# Patient Record
Sex: MALE | Race: WHITE | URBAN - METROPOLITAN AREA
[De-identification: names, ages, dates, MRNs, and addresses within clinical notes are randomized per-mention and may not be internally consistent; named-entity substitution may affect disease eponyms.]

---

## 2017-08-17 ENCOUNTER — OFFICE VISIT (OUTPATIENT)
Dept: FAMILY MEDICINE CLINIC | Facility: CLINIC | Age: 37
End: 2017-08-17

## 2017-08-17 VITALS
SYSTOLIC BLOOD PRESSURE: 120 MMHG | RESPIRATION RATE: 14 BRPM | TEMPERATURE: 98.1 F | DIASTOLIC BLOOD PRESSURE: 80 MMHG | WEIGHT: 196.4 LBS | HEART RATE: 68 BPM | OXYGEN SATURATION: 97 % | HEIGHT: 71 IN | BODY MASS INDEX: 27.5 KG/M2

## 2017-08-17 DIAGNOSIS — Z13.220 SCREENING, LIPID: ICD-10-CM

## 2017-08-17 DIAGNOSIS — F90.9 ATTENTION DEFICIT HYPERACTIVITY DISORDER (ADHD), UNSPECIFIED ADHD TYPE: ICD-10-CM

## 2017-08-17 DIAGNOSIS — F81.9 LEARNING DIFFICULTY: ICD-10-CM

## 2017-08-17 DIAGNOSIS — Z00.00 ROUTINE GENERAL MEDICAL EXAMINATION AT A HEALTH CARE FACILITY: Primary | ICD-10-CM

## 2017-08-17 RX ORDER — DEXTROAMPHETAMINE SACCHARATE, AMPHETAMINE ASPARTATE MONOHYDRATE, DEXTROAMPHETAMINE SULFATE AND AMPHETAMINE SULFATE 5; 5; 5; 5 MG/1; MG/1; MG/1; MG/1
20 CAPSULE, EXTENDED RELEASE ORAL
Qty: 30 CAP | Refills: 0 | Status: SHIPPED | OUTPATIENT
Start: 2017-08-17 | End: 2017-09-19 | Stop reason: DRUGHIGH

## 2017-08-17 NOTE — PROGRESS NOTES
SUBJECTIVE:  Vern Seymour is a 40y.o. year old male   Chief Complaint   Patient presents with    Behavioral Problem    Establish Care       History of Present Illness:   He has history of ADHD since childhood. He has had psychologic evaluations. Last one was done prior to this OV. He was advised to get ADHD Rx for upcoming college program.  He has been treated with Adderall off and on since elementary school to college. He has done well with it. He has H/O LD also. Patient denies any other issues. He has otherwise, normal Sensory, Motor, Social and  Language function. His symptoms are mostly moderate inattention. The patient often:  a. fails to give close attention to details or makes careless mistakes at work and during other activities. b. has difficulty sustaining attention in tasks or play activities  c. does not seem to listen when spoken to directly. d. does not follow through on instructions and fails to finish duties in the workplace. e. has difficulty organizing tasks and activities   f. avoids, dislikes, or is reluctant to engage in tasks that require sustained mental effort   g. Is easily distracted by extraneous stimuli   h. Is forgetful in daily activities. He denies any significant hyperactivity and impulsivity     Several inattentive symptoms were present prior to age 15 years. Several inattentive symptoms are present in two or more settings (at home, school, or work). There is clear evidence that the symptoms interfere with, or reduce the quality of, social, academic, or occupational functioning. No history of schizophrenia or another psychotic disorder. No history of depression, Bipolar disorder, Anxiety disorder or substance abuse.     Past Medical History:   Diagnosis Date    ADHD (attention deficit hyperactivity disorder) 80    Hx of gallstones 05/01/2011    Pseudotumor cerebri syndrome 01/01/1994     Past Surgical History:   Procedure Laterality Date    HX CHOLECYSTECTOMY  05/01/2011        No current outpatient prescriptions on file. No current facility-administered medications for this visit. Allergies   Allergen Reactions    Ddavp [Desmopressin] Other (comments)     Pseudotumor cerebri        Family History   Problem Relation Age of Onset    No Known Problems Mother     Arthritis-osteo Father         Social History   Substance Use Topics    Smoking status: Never Smoker    Smokeless tobacco: Never Used    Alcohol use Yes      Comment: occa. Review of Systems:   Constitutional: No fever, chills, night sweats, malaise, dizziness. Eyes: No eye discomfort, discharge, redness, new visual changes. Ear/Nose/Throat: No ear/ throat/ sinus pain, lesions, unusual discharge, new speaking or hearing problems, epistaxis. Cardiovascular: No angina, palpitations, PND, orthopnea, lightheadedness, edema, claudication. Respiratory: No dyspnea, wheeze, pleurisy, hemoptysis, unusual cough or sputum. Gastrointestinal: No nausea/ vomiting, bowel habit change, pain, BISI symptoms, melena, hematochezia, anorexia. Genitourinary: Denies any comlaints  Musculoskeletal: No joint swelling/pain, instability, focal weakness, stiffness/rigidity, radicular pain. Skin/Breast: Denies any complaints. Neurological: No seizures, numbness, dizziness, speech abnormality, incontinence. Psychiatric: No agitation, confusion/disorientation, suicidal or homicidal ideation. Endocrine: Denies any complaints. Heme/Onc/Lympha: Denies any complaints. Allergy/Immunol: Denies any complaints. OBJECTIVE:  Physical Exam:   Constitutional: General Appearance:  well developed, well nourished, nontoxic, in no acute distress.    Visit Vitals    /80 (BP 1 Location: Left arm, BP Patient Position: Sitting)    Pulse 68    Temp 98.1 °F (36.7 °C) (Oral)    Resp 14    Ht 5' 10.75\" (1.797 m)    Wt 196 lb 6.4 oz (89.1 kg)    SpO2 97%    BMI 27.59 kg/m2     Eyes: Conjunctiva: normal & Lids: normal.  Pupils & Irises: normal size; equal, round and reactive to light. ENT/Mouth: Otoscopic Examination: ear canals are clear; tympanic membranes are clear. Nose: clear. Throat:  Clear. Neck Area: Neck: without masses, symmetric, trachea is midline. Thyroid:  without significant enlargement, masses or tenderness. Pulmonary: Respiratory effort: normal; no dyspnea, no retractions, no accessory muscle use. Auscultation: normal & symmetrical air exchange; no rales, no rhonchi, no wheeze; no rubs    Cardiovascular: Palpation: PMI not displaced or enlarged, no thrills or heaves. Auscultation: RRR; no murmur, rubs or gallops. Extremities: no edema, no active varicosity. Neck: carotid arteries- normal volume & without bruit; no JVD. Femoral arteries: normal volume, no bruit. Pedal pulses: normal volume. Gastrointestinal: Normal bowel sounds. No masses; no tenderness; no rebound/rigidity; no CVA tenderness. No hepatosplenomegaly. No inguinal, ventral or umbilical hernias. Genitourinary: Scrotal: no cord tenderness, testicular masses, hydrocele or spermatocele. Penis: normal male, no masses, normal urethra, no discharge. Skin: Inspection: no significant rashes, lesions or ulcers. Nodes: Cervical: no significant adenopathy. Inguinal: no significant adenopathy. Psychiatric: Oriented to time, place and person. Normal mood, no agitation or anxiety. Normal affect. Pleasant and cooperative. Neurologic: CN I to XII: intact. DTR: symmetrical & normal.    Musculoskeletal: NC/AT. Neck-supple. Gait and Station: gait- normal; station- normal.  All Extremities: no heat, effusion, redness or tenderness; normal strength/tone; no instability; FROM. Spine/ back/ posture: normal.     ASSESSMENT:     1. Routine general medical examination at a health care facility    2. Attention deficit hyperactivity disorder (ADHD), unspecified ADHD type    3.  Learning difficulty    4. Screening, lipid        PLAN:     Orders Placed This Encounter    CBC WITH AUTOMATED DIFF    METABOLIC PANEL, COMPREHENSIVE    LIPID PANEL    TSH 3RD GENERATION    URINALYSIS W/ RFLX MICROSCOPIC    amphetamine-dextroamphetamine XR (ADDERALL XR) 20 mg XR capsule      Pharmacologic Management: Medications reviewed with the patient. Adderall XR 20 mg daily. Discussed nutrition, education, anticipatory guidence, accident prevention  Discussed DDx, follow-up & work-up. Discussed risk/benefit & side effect of treatment. Follow up visit as planned, prn sooner. Health risk from non adherence discussed. Patient voiced understanding. Follow-up Disposition:  Return in about 4 weeks (around 9/14/2017).     Magnus Carty MD

## 2017-08-17 NOTE — PROGRESS NOTES
Isrrael Negron is a 40 y.o.  male presents today for office visit for ADHD and to establish care. Pt is in Room # 4.      1. Have you been to the ER, urgent care clinic t? Hospitalized ? Yes 05/11/2011 - 05/13/2011 [address] [place] [address] 30, 323 [facility] Gallstones    2. Have you seen or consulted any other health care providers outside of the [address] [place] [address] ? Include any pap smears or colon screening. Yes [facility]. 6/2017      Health Maintenance reviewed.     Upcoming Appts  N/A

## 2017-08-17 NOTE — MR AVS SNAPSHOT
Visit Information Date & Time Provider Department Dept. Phone Encounter #  
 8/17/2017  4:30 PM Latoya Dey MD [facility] [phone] [id number] Follow-up Instructions Return in about 4 weeks (around 9/14/2017). Your Appointments 9/15/2017  3:00 PM  
Follow Up with Latoya Dey MD  
[facility] ([address]) Appt Note: f/u from 8/17/17  
 [address] [place] [address] [phone]  
[address] [facility] [address] Upcoming Health Maintenance Date Due DTaP/Tdap/Td series (1 - Tdap) 4/17/2001 INFLUENZA AGE 9 TO ADULT 8/1/2017 Allergies as of 8/17/2017  Review Complete On: 8/17/2017 By: Latoya Dey MD  
  
 Severity Noted Reaction Type Reactions Ddavp [Desmopressin] High 08/17/2017   Intolerance Other (comments) Pseudotumor cerebri Current Immunizations  Never Reviewed No immunizations on file. Not reviewed this visit You Were Diagnosed With   
  
 Codes Comments Attention deficit hyperactivity disorder (ADHD), unspecified ADHD type    -  Primary ICD-10-CM: F90.9 ICD-9-CM: 314.01 Learning difficulty     ICD-10-CM: F81.9 ICD-9-CM: 315.9 Screening, lipid     ICD-10-CM: L15.901 ICD-9-CM: V77.91 Vitals BP Pulse Temp Resp Height(growth percentile) Weight(growth percentile) 120/80 (BP 1 Location: Left arm, BP Patient Position: Sitting) 68 98.1 °F (36.7 °C) (Oral) 14 5' 10.75\" (1.797 m) 196 lb 6.4 oz (89.1 kg) SpO2 BMI Smoking Status 97% 27.59 kg/m2 Never Smoker Vitals History BMI and BSA Data Body Mass Index Body Surface Area  
 27.59 kg/m 2 2.11 m 2 Preferred Pharmacy Pharmacy Name Phone [facility] [phone] Your Updated Medication List  
  
   
 This list is accurate as of: 8/17/17  5:06 PM.  Always use your most recent med list.  
  
  
  
  
 amphetamine-dextroamphetamine XR 20 mg XR capsule Commonly known as:  ADDERALL XR Take 1 Cap (20 mg total) by mouth every morning. Max Daily Amount: 20 mg  
  
  
  
  
Prescriptions Printed Refills  
 amphetamine-dextroamphetamine XR (ADDERALL XR) 20 mg XR capsule 0 Sig: Take 1 Cap (20 mg total) by mouth every morning. Max Daily Amount: 20 mg  
 Class: Print Route: Oral  
  
Follow-up Instructions Return in about 4 weeks (around 9/14/2017). To-Do List   
 08/19/2017 Lab:  CBC WITH AUTOMATED DIFF   
  
 08/19/2017 Lab:  LIPID PANEL   
  
 08/19/2017 Lab:  METABOLIC PANEL, COMPREHENSIVE   
  
 08/19/2017 Lab:  TSH 3RD GENERATION   
  
 08/19/2017 Lab:  URINALYSIS W/ RFLX MICROSCOPIC Introducing [facility]! Елена Ron introduces [employer] patient portal. Now you can access parts of your medical record, email your doctor's office, and request medication refills online. 1. In your internet browser, go to [email or web address] 2. Click on the First Time User? Click Here link in the Sign In box. You will see the New Member Sign Up page. 3. Enter your [employer] Access Code exactly as it appears below. You will not need to use this code after youve completed the sign-up process. If you do not sign up before the expiration date, you must request a new code. · [employer] Access Code: [email or web address] Expires: 11/15/2017  3:45 PM 
 
4. Enter the last four digits of your Social Security Number (xxxx) and Date of Birth (mm/dd/yyyy) as indicated and click Submit. You will be taken to the next sign-up page. 5. Create a [employer] ID. This will be your [employer]ised login ID and cannot be changed, so think of one that is secure and easy to remember. 6. Create a [employer] password. You can change your password at any time. 7. Enter your Password Reset Question and Answer. This can be used at a later time if you forget your password. 8. Enter your e-mail address. You will receive e-mail notification when new information is available in [address] 9. Click Sign Up. You can now view and download portions of your medical record. 10. Click the Download Summary menu link to download a portable copy of your medical information. If you have questions, please visit the Frequently Asked Questions section of the [employer] website. Remember, [employer] is NOT to be used for urgent needs. For medical emergencies, dial 911. Now available from your iPhone and Android! Please provide this summary of care documentation to your next provider. Your primary care clinician is listed as [address] [place] [address]. If you have any questions after today's visit, please call [phone].

## 2017-08-19 DIAGNOSIS — F90.9 ATTENTION DEFICIT HYPERACTIVITY DISORDER (ADHD), UNSPECIFIED ADHD TYPE: ICD-10-CM

## 2017-08-19 DIAGNOSIS — Z13.220 SCREENING, LIPID: ICD-10-CM

## 2017-08-19 LAB
ALBUMIN SERPL-MCNC: 4.7 G/DL (ref 3.5–5.5)
ALBUMIN/GLOB SERPL: 2.1 {RATIO} (ref 1.2–2.2)
ALP SERPL-CCNC: 46 IU/L (ref 39–117)
ALT SERPL-CCNC: 22 IU/L (ref 0–44)
APPEARANCE UR: CLEAR
AST SERPL-CCNC: 21 IU/L (ref 0–40)
BASOPHILS # BLD AUTO: 0.1 X10E3/UL (ref 0–0.2)
BASOPHILS NFR BLD AUTO: 1 %
BILIRUB SERPL-MCNC: 0.6 MG/DL (ref 0–1.2)
BILIRUB UR QL STRIP: NEGATIVE
BUN SERPL-MCNC: 11 MG/DL (ref 6–20)
BUN/CREAT SERPL: 13 (ref 9–20)
CALCIUM SERPL-MCNC: 9.5 MG/DL (ref 8.7–10.2)
CHLORIDE SERPL-SCNC: 103 MMOL/L (ref 96–106)
CHOLEST SERPL-MCNC: 200 MG/DL (ref 100–199)
CO2 SERPL-SCNC: 29 MMOL/L (ref 18–29)
COLOR UR: YELLOW
CREAT SERPL-MCNC: 0.87 MG/DL (ref 0.76–1.27)
EOSINOPHIL # BLD AUTO: 0.1 X10E3/UL (ref 0–0.4)
EOSINOPHIL NFR BLD AUTO: 1 %
ERYTHROCYTE [DISTWIDTH] IN BLOOD BY AUTOMATED COUNT: 14.6 % (ref 12.3–15.4)
GLOBULIN SER CALC-MCNC: 2.2 G/DL (ref 1.5–4.5)
GLUCOSE SERPL-MCNC: 94 MG/DL (ref 65–99)
GLUCOSE UR QL: NEGATIVE
HCT VFR BLD AUTO: 41.3 % (ref 37.5–51)
HDLC SERPL-MCNC: 56 MG/DL
HGB BLD-MCNC: 13.8 G/DL (ref 12.6–17.7)
HGB UR QL STRIP: NEGATIVE
IMM GRANULOCYTES # BLD: 0 X10E3/UL (ref 0–0.1)
IMM GRANULOCYTES NFR BLD: 1 %
INTERPRETATION, 910389: NORMAL
KETONES UR QL STRIP: NEGATIVE
LDLC SERPL CALC-MCNC: 105 MG/DL (ref 0–99)
LEUKOCYTE ESTERASE UR QL STRIP: NEGATIVE
LYMPHOCYTES # BLD AUTO: 1.6 X10E3/UL (ref 0.7–3.1)
LYMPHOCYTES NFR BLD AUTO: 25 %
MCH RBC QN AUTO: 28.7 PG (ref 26.6–33)
MCHC RBC AUTO-ENTMCNC: 33.4 G/DL (ref 31.5–35.7)
MCV RBC AUTO: 86 FL (ref 79–97)
MICRO URNS: NORMAL
MONOCYTES # BLD AUTO: 0.7 X10E3/UL (ref 0.1–0.9)
MONOCYTES NFR BLD AUTO: 11 %
NEUTROPHILS # BLD AUTO: 3.7 X10E3/UL (ref 1.4–7)
NEUTROPHILS NFR BLD AUTO: 61 %
NITRITE UR QL STRIP: NEGATIVE
PH UR STRIP: 6 [PH] (ref 5–7.5)
PLATELET # BLD AUTO: 330 X10E3/UL (ref 150–379)
POTASSIUM SERPL-SCNC: 5 MMOL/L (ref 3.5–5.2)
PROT SERPL-MCNC: 6.9 G/DL (ref 6–8.5)
PROT UR QL STRIP: NEGATIVE
RBC # BLD AUTO: 4.81 X10E6/UL (ref 4.14–5.8)
SODIUM SERPL-SCNC: 143 MMOL/L (ref 134–144)
SP GR UR: 1.01 (ref 1–1.03)
TRIGL SERPL-MCNC: 193 MG/DL (ref 0–149)
TSH SERPL DL<=0.005 MIU/L-ACNC: 0.95 UIU/ML (ref 0.45–4.5)
UROBILINOGEN UR STRIP-MCNC: 0.2 MG/DL (ref 0.2–1)
VLDLC SERPL CALC-MCNC: 39 MG/DL (ref 5–40)
WBC # BLD AUTO: 6.1 X10E3/UL (ref 3.4–10.8)

## 2017-08-22 NOTE — PROGRESS NOTES
Lab tests were good except for high triglyceride and borderline high LDL. The patient is advised to continue with diet and exercise.

## 2017-08-24 ENCOUNTER — TELEPHONE (OUTPATIENT)
Dept: FAMILY MEDICINE CLINIC | Facility: CLINIC | Age: 37
End: 2017-08-24

## 2017-08-24 NOTE — TELEPHONE ENCOUNTER
Lab tests were good except for high triglyceride and borderline high LDL.  The patient is advised to continue with diet and exercise. Spoke with pt in regards to results Pt acknowledges understanding and voices no concerns at this time.

## 2017-08-24 NOTE — TELEPHONE ENCOUNTER
Received a call from the pt's mother, Melani Quevedo in regards to lab results. Two pt identifier's and permission to release verified. Relayed 's notes. Pt's mother acknowledges understanding and voices no concerns at this time.

## 2017-09-19 ENCOUNTER — OFFICE VISIT (OUTPATIENT)
Dept: FAMILY MEDICINE CLINIC | Facility: CLINIC | Age: 37
End: 2017-09-19

## 2017-09-19 VITALS
DIASTOLIC BLOOD PRESSURE: 76 MMHG | TEMPERATURE: 97.6 F | SYSTOLIC BLOOD PRESSURE: 114 MMHG | BODY MASS INDEX: 27.3 KG/M2 | OXYGEN SATURATION: 97 % | RESPIRATION RATE: 15 BRPM | HEIGHT: 71 IN | WEIGHT: 195 LBS | HEART RATE: 60 BPM

## 2017-09-19 DIAGNOSIS — F90.9 ATTENTION DEFICIT HYPERACTIVITY DISORDER (ADHD), UNSPECIFIED ADHD TYPE: Primary | ICD-10-CM

## 2017-09-19 DIAGNOSIS — H61.22 EXCESSIVE EAR WAX, LEFT: ICD-10-CM

## 2017-09-19 DIAGNOSIS — E78.2 MIXED HYPERLIPIDEMIA: ICD-10-CM

## 2017-09-19 PROBLEM — H61.20 EXCESSIVE EAR WAX: Status: ACTIVE | Noted: 2017-09-19

## 2017-09-19 RX ORDER — DEXTROAMPHETAMINE SACCHARATE, AMPHETAMINE ASPARTATE, DEXTROAMPHETAMINE SULFATE AND AMPHETAMINE SULFATE 2.5; 2.5; 2.5; 2.5 MG/1; MG/1; MG/1; MG/1
10 TABLET ORAL 2 TIMES DAILY
Qty: 60 TAB | Refills: 0 | Status: SHIPPED | OUTPATIENT
Start: 2017-09-19 | End: 2018-04-04 | Stop reason: SDUPTHER

## 2017-09-19 NOTE — PROGRESS NOTES
SUBJECTIVE:  Hilda Rajan is a 40y.o. year old male   Chief Complaint   Patient presents with    Follow-up    Medication Evaluation       History of Present Illness:     He has history of ADHD since childhood. He has had psychologic evaluations. He was advised to get ADHD Rx for upcoming college program.  He has been treated with Adderall off and on since elementary school to college. He has done well with it. He has started taking Adderall XR since last OV a month ago. He is concentrating better; but he is having problem with appetite and insomnia. So he is taking 4 days a week. He has Earwax. He is putting Debrox and it is helping. He has mild elevation of triglyceride. He is following life style modifications for it. Past Medical History:   Diagnosis Date    ADHD (attention deficit hyperactivity disorder) 80    Hx of gallstones 05/01/2011    Pseudotumor cerebri syndrome 01/01/1994     Past Surgical History:   Procedure Laterality Date    HX CHOLECYSTECTOMY  05/01/2011        Current Outpatient Prescriptions   Medication Sig    amphetamine-dextroamphetamine XR (ADDERALL XR) 20 mg XR capsule Take 1 Cap (20 mg total) by mouth every morning. Max Daily Amount: 20 mg     No current facility-administered medications for this visit. Allergies   Allergen Reactions    Ddavp [Desmopressin] Other (comments)     Pseudotumor cerebri        Family History   Problem Relation Age of Onset    No Known Problems Mother     Arthritis-osteo Father         Social History   Substance Use Topics    Smoking status: Never Smoker    Smokeless tobacco: Never Used    Alcohol use Yes      Comment: occa. Review of Systems:   Constitutional: No fever, chills, night sweats, malaise, dizziness. Eyes: No eye discomfort, discharge, redness, new visual changes. Cardiovascular: No angina, palpitations, PND, orthopnea, lightheadedness, edema, claudication.   Respiratory: No dyspnea, wheeze, pleurisy, hemoptysis, unusual cough or sputum. Gastrointestinal: No nausea/ vomiting, bowel habit change, pain, BISI symptoms, melena, hematochezia, anorexia. Musculoskeletal: No joint swelling/pain, instability, focal weakness, stiffness/rigidity, radicular pain. Neurological: No seizures, numbness, dizziness, speech abnormality, incontinence. Psychiatric: No agitation, confusion/disorientation, suicidal or homicidal ideation. OBJECTIVE:  Physical Exam:   Constitutional: General Appearance:  well developed, well nourished, nontoxic, in no acute distress. Visit Vitals    /76 (BP 1 Location: Left arm, BP Patient Position: Sitting)    Pulse 60    Temp 97.6 °F (36.4 °C) (Oral)    Resp 15    Ht 5' 10.75\" (1.797 m)    Wt 195 lb (88.5 kg)    SpO2 97%    BMI 27.39 kg/m2     Eyes: Pupils & Irises: normal size; equal, round and reactive to light. ENT[de-identified] Otoscopic Examination: Rt= ear canals is clear; tympanic membrane is clear. Lt+ ear canal is blocked with cerumen. Pulmonary: Respiratory effort: normal; no dyspnea, no retractions, no accessory muscle use. Auscultation: normal & symmetrical air exchange; no rales, no rhonchi, no wheeze; no rubs    Cardiovascular: Palpation: PMI not displaced or enlarged, no thrills or heaves. Auscultation: RRR; no murmur, rubs or gallops. Neck: carotid arteries- normal volume & without bruit; no JVD. Gastrointestinal: Normal bowel sounds. No masses; no tenderness; no rebound/rigidity; no CVA tenderness. No hepatosplenomegaly. Psychiatric: Oriented to time, place and person. Normal mood, no agitation or anxiety. Normal affect. Pleasant and cooperative. Neurologic: CN I to XII: intact. DTR: symmetrical & normal.    Musculoskeletal: NC/AT. Neck-supple.  Gait and Station: gait- normal; station- normal.     Lab Results   Component Value Date/Time    TSH 0.954 08/18/2017 01:22 PM     Lab Results   Component Value Date/Time    WBC 6.1 08/18/2017 01:22 PM    HGB 13.8 08/18/2017 01:22 PM    HCT 41.3 08/18/2017 01:22 PM    PLATELET 367 82/42/3364 01:22 PM    MCV 86 08/18/2017 01:22 PM     Lab Results   Component Value Date/Time    Sodium 143 08/18/2017 01:22 PM    Potassium 5.0 08/18/2017 01:22 PM    Chloride 103 08/18/2017 01:22 PM    CO2 29 08/18/2017 01:22 PM    Glucose 94 08/18/2017 01:22 PM    BUN 11 08/18/2017 01:22 PM    Creatinine 0.87 08/18/2017 01:22 PM    BUN/Creatinine ratio 13 08/18/2017 01:22 PM    GFR est  08/18/2017 01:22 PM    GFR est non- 08/18/2017 01:22 PM    Calcium 9.5 08/18/2017 01:22 PM    Bilirubin, total 0.6 08/18/2017 01:22 PM    AST (SGOT) 21 08/18/2017 01:22 PM    Alk. phosphatase 46 08/18/2017 01:22 PM    Protein, total 6.9 08/18/2017 01:22 PM    Albumin 4.7 08/18/2017 01:22 PM    A-G Ratio 2.1 08/18/2017 01:22 PM    ALT (SGPT) 22 08/18/2017 01:22 PM     Lab Results   Component Value Date/Time    Cholesterol, total 200 08/18/2017 01:22 PM    HDL Cholesterol 56 08/18/2017 01:22 PM    LDL, calculated 105 08/18/2017 01:22 PM    VLDL, calculated 39 08/18/2017 01:22 PM    Triglyceride 193 08/18/2017 01:22 PM        ASSESSMENT:     1. Attention deficit hyperactivity disorder (ADHD), unspecified ADHD type    2. Mixed hyperlipidemia    3. Excessive ear wax, left        PLAN:     Orders Placed This Encounter    dextroamphetamine-amphetamine (ADDERALL) 10 mg tablet      Pharmacologic Management: Medications reviewed with the patient. Change Adderall XR 20 mg daily to Adderall 10 mg BID. Continue with Debrox until clear. Sleep hygiene. Discussed DDx, follow-up & work-up. Discussed risk/benefit & side effect of treatment. Follow up visit as planned, prn sooner. Health risk from non adherence discussed. Patient voiced understanding. Follow-up Disposition:  Return in about 1 month (around 10/19/2017).     Silvina Olmos MD

## 2017-09-19 NOTE — PROGRESS NOTES
Elier Mcintosh is a 40 y.o.  male presents today for office visit for follow up. Pt is in Room # 5.      1. Have you been to the ER, urgent care clinic since your last visit? Hospitalized since your last visit? No    2. Have you seen or consulted any other health care providers outside of the [address] [place] [address] since your last visit? Include any pap smears or colon screening. No    Health Maintenance reviewed. Pt declines all vaccines at this time.       Upcoming Appts  N/a

## 2018-03-15 ENCOUNTER — TELEPHONE (OUTPATIENT)
Dept: FAMILY MEDICINE CLINIC | Age: 38
End: 2018-03-15

## 2018-03-15 NOTE — TELEPHONE ENCOUNTER
Pt is calling to request any lab work to be in that will be needed for his follow up. Pt is wanting to get it done ahead of time to be able to go over it at the appt. Pt is scheduled 4/4/18 for a follow up and med check. Pt's last appt was 9/19/17. Pt would like a call with an answer in regard to this. Please advise.

## 2018-03-26 DIAGNOSIS — E78.2 MIXED HYPERLIPIDEMIA: Primary | ICD-10-CM

## 2018-04-04 ENCOUNTER — HOSPITAL ENCOUNTER (OUTPATIENT)
Dept: LAB | Age: 38
Discharge: HOME | End: 2018-04-04
Payer: COMMERCIAL

## 2018-04-04 ENCOUNTER — OFFICE VISIT (OUTPATIENT)
Dept: FAMILY MEDICINE CLINIC | Age: 38
End: 2018-04-04

## 2018-04-04 VITALS
RESPIRATION RATE: 16 BRPM | TEMPERATURE: 96.3 F | HEIGHT: 71 IN | HEART RATE: 76 BPM | WEIGHT: 186 LBS | OXYGEN SATURATION: 100 % | SYSTOLIC BLOOD PRESSURE: 114 MMHG | BODY MASS INDEX: 26.04 KG/M2 | DIASTOLIC BLOOD PRESSURE: 80 MMHG

## 2018-04-04 DIAGNOSIS — E78.2 MIXED HYPERLIPIDEMIA: ICD-10-CM

## 2018-04-04 DIAGNOSIS — F90.9 ATTENTION DEFICIT HYPERACTIVITY DISORDER (ADHD), UNSPECIFIED ADHD TYPE: Primary | ICD-10-CM

## 2018-04-04 LAB
CHOLEST SERPL-MCNC: 176 MG/DL
HDLC SERPL-MCNC: 63 MG/DL (ref 40–60)
HDLC SERPL: 2.8 {RATIO} (ref 0–5)
LDLC SERPL CALC-MCNC: 98.8 MG/DL (ref 0–100)
LIPID PROFILE,FLP: ABNORMAL
TRIGL SERPL-MCNC: 71 MG/DL (ref ?–150)
VLDLC SERPL CALC-MCNC: 14.2 MG/DL

## 2018-04-04 PROCEDURE — 80061 LIPID PANEL: CPT | Performed by: FAMILY MEDICINE

## 2018-04-04 RX ORDER — DEXTROAMPHETAMINE SACCHARATE, AMPHETAMINE ASPARTATE, DEXTROAMPHETAMINE SULFATE AND AMPHETAMINE SULFATE 2.5; 2.5; 2.5; 2.5 MG/1; MG/1; MG/1; MG/1
10 TABLET ORAL 2 TIMES DAILY
Qty: 60 TAB | Refills: 0 | Status: SHIPPED | OUTPATIENT
Start: 2018-04-04

## 2018-04-04 NOTE — PATIENT INSTRUCTIONS
Body Mass Index: Care Instructions  Your Care Instructions    Body mass index (BMI) can help you see if your weight is raising your risk for health problems. It uses a formula to compare how much you weigh with how tall you are. · A BMI lower than 18.5 is considered underweight. · A BMI between 18.5 and 24.9 is considered healthy. · A BMI between 25 and 29.9 is considered overweight. A BMI of 30 or higher is considered obese. If your BMI is in the normal range, it means that you have a lower risk for weight-related health problems. If your BMI is in the overweight or obese range, you may be at increased risk for weight-related health problems, such as high blood pressure, heart disease, stroke, arthritis or joint pain, and diabetes. If your BMI is in the underweight range, you may be at increased risk for health problems such as fatigue, lower protection (immunity) against illness, muscle loss, bone loss, hair loss, and hormone problems. BMI is just one measure of your risk for weight-related health problems. You may be at higher risk for health problems if you are not active, you eat an unhealthy diet, or you drink too much alcohol or use tobacco products. Follow-up care is a key part of your treatment and safety. Be sure to make and go to all appointments, and call your doctor if you are having problems. It's also a good idea to know your test results and keep a list of the medicines you take. How can you care for yourself at home? · Practice healthy eating habits. This includes eating plenty of fruits, vegetables, whole grains, lean protein, and low-fat dairy. · If your doctor recommends it, get more exercise. Walking is a good choice. Bit by bit, increase the amount you walk every day. Try for at least 30 minutes on most days of the week. · Do not smoke. Smoking can increase your risk for health problems. If you need help quitting, talk to your doctor about stop-smoking programs and medicines. These can increase your chances of quitting for good. · Limit alcohol to 2 drinks a day for men and 1 drink a day for women. Too much alcohol can cause health problems. If you have a BMI higher than 25  · Your doctor may do other tests to check your risk for weight-related health problems. This may include measuring the distance around your waist. A waist measurement of more than 40 inches in men or 35 inches in women can increase the risk of weight-related health problems. · Talk with your doctor about steps you can take to stay healthy or improve your health. You may need to make lifestyle changes to lose weight and stay healthy, such as changing your diet and getting regular exercise. If you have a BMI lower than 18.5  · Your doctor may do other tests to check your risk for health problems. · Talk with your doctor about steps you can take to stay healthy or improve your health. You may need to make lifestyle changes to gain or maintain weight and stay healthy, such as getting more healthy foods in your diet and doing exercises to build muscle. Where can you learn more? Go to http://jose-bethanie.info/. Enter S176 in the search box to learn more about \"Body Mass Index: Care Instructions. \"  Current as of: October 13, 2016  Content Version: 11.4  © 9421-8318 [employer]. Care instructions adapted under license by [employer] (which disclaims liability or warranty for this information). If you have questions about a medical condition or this instruction, always ask your healthcare professional. [place] [address] any warranty or liability for your use of this information. Starting a Weight Loss Plan: Care Instructions  Your Care Instructions    If you are thinking about losing weight, it can be hard to know where to start. Your doctor can help you set up a weight loss plan that best meets your needs.  You may want to take a class on nutrition or exercise, or join a weight loss support group. If you have questions about how to make changes to your eating or exercise habits, ask your doctor about seeing a registered dietitian or an exercise specialist.  It can be a big challenge to lose weight. But you do not have to make huge changes at once. Make small changes, and stick with them. When those changes become habit, add a few more changes. If you do not think you are ready to make changes right now, try to pick a date in the future. Make an appointment to see your doctor to discuss whether the time is right for you to start a plan. Follow-up care is a key part of your treatment and safety. Be sure to make and go to all appointments, and call your doctor if you are having problems. It's also a good idea to know your test results and keep a list of the medicines you take. How can you care for yourself at home? · Set realistic goals. Many people expect to lose much more weight than is likely. A weight loss of 5% to 10% of your body weight may be enough to improve your health. · Get family and friends involved to provide support. Talk to them about why you are trying to lose weight, and ask them to help. They can help by participating in exercise and having meals with you, even if they may be eating something different. · Find what works best for you. If you do not have time or do not like to cook, a program that offers meal replacement bars or shakes may be better for you. Or if you like to prepare meals, finding a plan that includes daily menus and recipes may be best.  · Ask your doctor about other health professionals who can help you achieve your weight loss goals. ¨ A dietitian can help you make healthy changes in your diet.   ¨ An exercise specialist or  can help you develop a safe and effective exercise program.  ¨ A counselor or psychiatrist can help you cope with issues such as depression, anxiety, or family problems that can make it hard to focus on weight loss. · Consider joining a support group for people who are trying to lose weight. Your doctor can suggest groups in your area. Where can you learn more? Go to http://jose-bethanie.info/. Enter Q835 in the search box to learn more about \"Starting a Weight Loss Plan: Care Instructions. \"  Current as of: October 13, 2016  Content Version: 11.4  © 1186-7535 [employer]. Care instructions adapted under license by [employer] (which disclaims liability or warranty for this information). If you have questions about a medical condition or this instruction, always ask your healthcare professional. [address] any warranty or liability for your use of this information.

## 2018-04-04 NOTE — PROGRESS NOTES
SUBJECTIVE:  Abdirashid Salmeron is a 40y.o. year old male   Chief Complaint   Patient presents with    Medication Evaluation       History of Present Illness:     He has history of ADHD since childhood. He has had psychologic evaluations. He was advised to get ADHD Rx for upcoming college program.  He has been treated with Adderall off and on since elementary school to college. He has done well with it. He has started taking Adderall XR since 6 months ago. He is concentrating better. He is not having problem with appetite and insomnia since his Adderall XR was changed to regular Adderall. .  So he is taking 4 days a week when his college is open. He has mild elevation of triglyceride. He is following life style modifications for it and los some weight. Past Medical History:   Diagnosis Date    ADHD (attention deficit hyperactivity disorder) 80    Hx of gallstones 05/01/2011    Pseudotumor cerebri syndrome 01/01/1994     Past Surgical History:   Procedure Laterality Date    HX CHOLECYSTECTOMY  05/01/2011        Current Outpatient Prescriptions   Medication Sig    dextroamphetamine-amphetamine (ADDERALL) 10 mg tablet Take 1 Tab (10 mg total) by mouth two (2) times a day. Max Daily Amount: 20 mg     No current facility-administered medications for this visit. Allergies   Allergen Reactions    Ddavp [Desmopressin] Other (comments)     Pseudotumor cerebri        Family History   Problem Relation Age of Onset    No Known Problems Mother     Arthritis-osteo Father         Social History   Substance Use Topics    Smoking status: Never Smoker    Smokeless tobacco: Never Used    Alcohol use Yes      Comment: occa. Review of Systems:   Constitutional: No fever, chills, night sweats, malaise, dizziness. Cardiovascular: No angina, palpitations, PND, orthopnea, lightheadedness, edema, claudication. Respiratory: No dyspnea, wheeze, pleurisy, hemoptysis, unusual cough or sputum. Gastrointestinal: No nausea/ vomiting, bowel habit change, pain, BISI symptoms, melena, hematochezia, anorexia. Musculoskeletal: No joint swelling/pain, instability, focal weakness, stiffness/rigidity, radicular pain. Neurological: No seizures, numbness, dizziness, speech abnormality, incontinence. Psychiatric: No agitation, confusion/disorientation, suicidal or homicidal ideation. OBJECTIVE:  Physical Exam:   Constitutional: General Appearance:  well developed, well nourished, nontoxic, in no acute distress. Visit Vitals    /80 (BP 1 Location: Left arm, BP Patient Position: Sitting)    Pulse 76    Temp 96.3 °F (35.7 °C) (Oral)    Resp 16    Ht 5' 10.75\" (1.797 m)    Wt 186 lb (84.4 kg)    SpO2 100%    BMI 26.13 kg/m2     Eyes: Pupils & Irises: normal size; equal, round and reactive to light. Pulmonary: Respiratory effort: normal; no dyspnea, no retractions, no accessory muscle use. Auscultation: normal & symmetrical air exchange; no rales, no rhonchi, no wheeze; no rubs    Cardiovascular: Palpation: PMI not displaced or enlarged, no thrills or heaves. Auscultation: RRR; no murmur, rubs or gallops. Neck: carotid arteries- normal volume & without bruit; no JVD. Gastrointestinal: Normal bowel sounds. No masses; no tenderness; no rebound/rigidity; no CVA tenderness. No hepatosplenomegaly. Psychiatric: Oriented to time, place and person. Normal mood, no agitation or anxiety. Normal affect. Pleasant and cooperative. Neurologic: CN I to XII: intact. DTR: symmetrical & normal.    Musculoskeletal:   NC/AT. Neck-supple.    Gait and Station: gait- normal; station- normal.     Lab Results   Component Value Date/Time    TSH 0.954 08/18/2017 01:22 PM     Lab Results   Component Value Date/Time    WBC 6.1 08/18/2017 01:22 PM    HGB 13.8 08/18/2017 01:22 PM    HCT 41.3 08/18/2017 01:22 PM    PLATELET 771 90/10/6616 01:22 PM    MCV 86 08/18/2017 01:22 PM     Lab Results   Component Value Date/Time    Sodium 143 08/18/2017 01:22 PM    Potassium 5.0 08/18/2017 01:22 PM    Chloride 103 08/18/2017 01:22 PM    CO2 29 08/18/2017 01:22 PM    Glucose 94 08/18/2017 01:22 PM    BUN 11 08/18/2017 01:22 PM    Creatinine 0.87 08/18/2017 01:22 PM    BUN/Creatinine ratio 13 08/18/2017 01:22 PM    GFR est  08/18/2017 01:22 PM    GFR est non- 08/18/2017 01:22 PM    Calcium 9.5 08/18/2017 01:22 PM    Bilirubin, total 0.6 08/18/2017 01:22 PM    AST (SGOT) 21 08/18/2017 01:22 PM    Alk. phosphatase 46 08/18/2017 01:22 PM    Protein, total 6.9 08/18/2017 01:22 PM    Albumin 4.7 08/18/2017 01:22 PM    A-G Ratio 2.1 08/18/2017 01:22 PM    ALT (SGPT) 22 08/18/2017 01:22 PM     Lab Results   Component Value Date/Time    Cholesterol, total 200 (H) 08/18/2017 01:22 PM    HDL Cholesterol 56 08/18/2017 01:22 PM    LDL, calculated 105 (H) 08/18/2017 01:22 PM    VLDL, calculated 39 08/18/2017 01:22 PM    Triglyceride 193 (H) 08/18/2017 01:22 PM        ASSESSMENT:     1. Attention deficit hyperactivity disorder (ADHD), unspecified ADHD type    2. Mixed hyperlipidemia        PLAN:     Orders Placed This Encounter    dextroamphetamine-amphetamine (ADDERALL) 10 mg tablet    LIPID panel already ordered. Pharmacologic Management: Medications reviewed with the patient. No change. [facility]  reviewed. Discussed DDx, follow-up & work-up. Discussed risk/benefit & side effect of treatment. Follow up visit as planned, prn sooner. Health risk from non adherence discussed. Patient voiced understanding. Follow-up Disposition:  Return in about 6 months (around 10/4/2018).     Ramírez Martines MD

## 2018-04-05 ENCOUNTER — TELEPHONE (OUTPATIENT)
Dept: FAMILY MEDICINE CLINIC | Age: 38
End: 2018-04-05

## 2018-04-05 NOTE — TELEPHONE ENCOUNTER
Pt returned call to office, advised pt of message from Dr. Americo Libman. Pt was understanding. Closing encounter.

## 2018-04-05 NOTE — TELEPHONE ENCOUNTER
----- Message from Rolando Hoyos MD sent at 4/4/2018  8:13 PM EDT -----  Triglyceride was much better. The patient is advised to continue with diet and exercise.
